# Patient Record
(demographics unavailable — no encounter records)

---

## 2025-01-15 NOTE — ASSESSMENT
[FreeTextEntry1] : Data reviewed:  CT chest [facility] 11/2024 personally reviewed and shows mild emphysema, as before, with no sig change, tiny nodules up to 3mm  Impression: Mild emphysema Remote, minimal smoker Asymptomatic  Plan: Again no eval or treatment needed for these minimal findings. Does not need follow up imaging. Does not fall into screening population.

## 2025-01-15 NOTE — CONSULT LETTER
[Dear  ___] : Dear  [unfilled], [Please see my note below.] : Please see my note below. [Consult Closing:] : Thank you very much for allowing me to participate in the care of this patient.  If you have any questions, please do not hesitate to contact me. [Sincerely,] : Sincerely, [Courtesy Letter:] : I had the pleasure of seeing your patient, [unfilled], in my office today. [FreeTextEntry2] : Timothy Hart MD\par  [address] \[place] [address] [FreeTextEntry3] : Amparo Szymanski MD, FCCP\par

## 2025-01-15 NOTE — HISTORY OF PRESENT ILLNESS
[Former] : former [< 20 pack-years] : < 20 pack-years [TextBox_4] : 04/07/2023: Asked to evaluate patient by Dr Hart for abnormal CT chest. He smoked as a teen and quit as a teen. He's not dyspneic. He's a  for [employer]. Not limited by dyspnea at all.  1/15/2025: Here w wife. He returns to review a new scan. He remains asymptomatic. He has no dyspnea or cough. 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 He has no interval health problems. Remote smoking as a teen only.